# Patient Record
Sex: FEMALE | Race: WHITE | Employment: OTHER | URBAN - METROPOLITAN AREA
[De-identification: names, ages, dates, MRNs, and addresses within clinical notes are randomized per-mention and may not be internally consistent; named-entity substitution may affect disease eponyms.]

---

## 2020-01-07 ENCOUNTER — HOSPITAL ENCOUNTER (EMERGENCY)
Age: 40
Discharge: HOME | End: 2020-01-07
Attending: EMERGENCY MEDICINE
Payer: MEDICARE

## 2020-01-07 ENCOUNTER — APPOINTMENT (OUTPATIENT)
Dept: CT IMAGING | Age: 40
End: 2020-01-07
Payer: MEDICARE

## 2020-01-07 VITALS
TEMPERATURE: 98.5 F | BODY MASS INDEX: 29.83 KG/M2 | DIASTOLIC BLOOD PRESSURE: 79 MMHG | WEIGHT: 179.01 LBS | RESPIRATION RATE: 22 BRPM | HEIGHT: 65 IN | SYSTOLIC BLOOD PRESSURE: 144 MMHG | OXYGEN SATURATION: 99 % | HEART RATE: 99 BPM

## 2020-01-07 LAB
A/G RATIO: 1.4 (ref 1.1–2.2)
ALBUMIN SERPL-MCNC: 4.5 G/DL (ref 3.4–5)
ALP BLD-CCNC: 107 U/L (ref 40–129)
ALT SERPL-CCNC: 19 U/L (ref 10–40)
ANION GAP SERPL CALCULATED.3IONS-SCNC: 19 MMOL/L (ref 3–16)
AST SERPL-CCNC: 25 U/L (ref 15–37)
BACTERIA: ABNORMAL /HPF
BASOPHILS ABSOLUTE: 0.1 K/UL (ref 0–0.2)
BASOPHILS RELATIVE PERCENT: 0.5 %
BILIRUB SERPL-MCNC: 0.5 MG/DL (ref 0–1)
BILIRUBIN URINE: NEGATIVE
BLOOD, URINE: ABNORMAL
BUN BLDV-MCNC: 12 MG/DL (ref 7–20)
CALCIUM SERPL-MCNC: 9.2 MG/DL (ref 8.3–10.6)
CHLORIDE BLD-SCNC: 96 MMOL/L (ref 99–110)
CLARITY: CLEAR
CO2: 18 MMOL/L (ref 21–32)
COLOR: YELLOW
COMMENT UA: ABNORMAL
CREAT SERPL-MCNC: <0.5 MG/DL (ref 0.6–1.1)
EKG ATRIAL RATE: 107 BPM
EKG DIAGNOSIS: NORMAL
EKG P AXIS: 69 DEGREES
EKG P-R INTERVAL: 170 MS
EKG Q-T INTERVAL: 346 MS
EKG QRS DURATION: 102 MS
EKG QTC CALCULATION (BAZETT): 461 MS
EKG R AXIS: 83 DEGREES
EKG T AXIS: 23 DEGREES
EKG VENTRICULAR RATE: 107 BPM
EOSINOPHILS ABSOLUTE: 0 K/UL (ref 0–0.6)
EOSINOPHILS RELATIVE PERCENT: 0.1 %
EPITHELIAL CELLS, UA: 5 /HPF (ref 0–5)
GFR AFRICAN AMERICAN: >60
GFR NON-AFRICAN AMERICAN: >60
GLOBULIN: 3.2 G/DL
GLUCOSE BLD-MCNC: 102 MG/DL (ref 70–99)
GLUCOSE BLD-MCNC: 116 MG/DL (ref 70–99)
GLUCOSE URINE: NEGATIVE MG/DL
HCG QUALITATIVE: NEGATIVE
HCT VFR BLD CALC: 41.7 % (ref 36–48)
HEMOGLOBIN: 14.3 G/DL (ref 12–16)
HYALINE CASTS: 11 /LPF (ref 0–8)
KETONES, URINE: >=80 MG/DL
LEUKOCYTE ESTERASE, URINE: NEGATIVE
LIPASE: 8 U/L (ref 13–60)
LYMPHOCYTES ABSOLUTE: 1 K/UL (ref 1–5.1)
LYMPHOCYTES RELATIVE PERCENT: 8.2 %
MCH RBC QN AUTO: 29.9 PG (ref 26–34)
MCHC RBC AUTO-ENTMCNC: 34.2 G/DL (ref 31–36)
MCV RBC AUTO: 87.3 FL (ref 80–100)
MICROSCOPIC EXAMINATION: YES
MONOCYTES ABSOLUTE: 0.7 K/UL (ref 0–1.3)
MONOCYTES RELATIVE PERCENT: 5.6 %
NEUTROPHILS ABSOLUTE: 10.4 K/UL (ref 1.7–7.7)
NEUTROPHILS RELATIVE PERCENT: 85.6 %
NITRITE, URINE: NEGATIVE
PDW BLD-RTO: 12.5 % (ref 12.4–15.4)
PERFORMED ON: ABNORMAL
PH UA: 6 (ref 5–8)
PLATELET # BLD: 300 K/UL (ref 135–450)
PMV BLD AUTO: 10.3 FL (ref 5–10.5)
POTASSIUM REFLEX MAGNESIUM: 4.3 MMOL/L (ref 3.5–5.1)
PROTEIN UA: NEGATIVE MG/DL
RBC # BLD: 4.77 M/UL (ref 4–5.2)
RBC UA: ABNORMAL /HPF (ref 0–2)
SODIUM BLD-SCNC: 133 MMOL/L (ref 136–145)
SPECIFIC GRAVITY UA: 1.02 (ref 1–1.03)
TOTAL PROTEIN: 7.7 G/DL (ref 6.4–8.2)
URINE REFLEX TO CULTURE: YES
URINE TYPE: ABNORMAL
UROBILINOGEN, URINE: 0.2 E.U./DL
WBC # BLD: 12.1 K/UL (ref 4–11)
WBC UA: 9 /HPF (ref 0–5)

## 2020-01-07 PROCEDURE — 6360000002 HC RX W HCPCS: Performed by: EMERGENCY MEDICINE

## 2020-01-07 PROCEDURE — 87086 URINE CULTURE/COLONY COUNT: CPT

## 2020-01-07 PROCEDURE — 81001 URINALYSIS AUTO W/SCOPE: CPT

## 2020-01-07 PROCEDURE — 93005 ELECTROCARDIOGRAM TRACING: CPT | Performed by: EMERGENCY MEDICINE

## 2020-01-07 PROCEDURE — 80053 COMPREHEN METABOLIC PANEL: CPT

## 2020-01-07 PROCEDURE — 70450 CT HEAD/BRAIN W/O DYE: CPT

## 2020-01-07 PROCEDURE — 6370000000 HC RX 637 (ALT 250 FOR IP): Performed by: EMERGENCY MEDICINE

## 2020-01-07 PROCEDURE — 93010 ELECTROCARDIOGRAM REPORT: CPT | Performed by: INTERNAL MEDICINE

## 2020-01-07 PROCEDURE — 99285 EMERGENCY DEPT VISIT HI MDM: CPT

## 2020-01-07 PROCEDURE — 84703 CHORIONIC GONADOTROPIN ASSAY: CPT

## 2020-01-07 PROCEDURE — 83690 ASSAY OF LIPASE: CPT

## 2020-01-07 PROCEDURE — 2580000003 HC RX 258: Performed by: EMERGENCY MEDICINE

## 2020-01-07 PROCEDURE — 85025 COMPLETE CBC W/AUTO DIFF WBC: CPT

## 2020-01-07 PROCEDURE — 96374 THER/PROPH/DIAG INJ IV PUSH: CPT

## 2020-01-07 RX ORDER — PREGABALIN 300 MG/1
300 CAPSULE ORAL DAILY
COMMUNITY
End: 2020-01-07 | Stop reason: SDUPTHER

## 2020-01-07 RX ORDER — TIZANIDINE 4 MG/1
4-8 TABLET ORAL EVERY 6 HOURS PRN
COMMUNITY

## 2020-01-07 RX ORDER — METHIMAZOLE 10 MG/1
5 TABLET ORAL EVERY MORNING
COMMUNITY

## 2020-01-07 RX ORDER — ONDANSETRON 2 MG/ML
4 INJECTION INTRAMUSCULAR; INTRAVENOUS ONCE
Status: COMPLETED | OUTPATIENT
Start: 2020-01-07 | End: 2020-01-07

## 2020-01-07 RX ORDER — NORTRIPTYLINE HYDROCHLORIDE 75 MG/1
75 CAPSULE ORAL NIGHTLY
COMMUNITY

## 2020-01-07 RX ORDER — PREGABALIN 300 MG/1
300 CAPSULE ORAL DAILY
Qty: 30 CAPSULE | Refills: 0 | Status: SHIPPED | OUTPATIENT
Start: 2020-01-07 | End: 2020-02-06

## 2020-01-07 RX ORDER — ACETAMINOPHEN 500 MG
1000 TABLET ORAL ONCE
Status: COMPLETED | OUTPATIENT
Start: 2020-01-07 | End: 2020-01-07

## 2020-01-07 RX ORDER — 0.9 % SODIUM CHLORIDE 0.9 %
1000 INTRAVENOUS SOLUTION INTRAVENOUS ONCE
Status: COMPLETED | OUTPATIENT
Start: 2020-01-07 | End: 2020-01-07

## 2020-01-07 RX ORDER — KETOROLAC TROMETHAMINE 30 MG/ML
30 INJECTION, SOLUTION INTRAMUSCULAR; INTRAVENOUS ONCE
Status: DISCONTINUED | OUTPATIENT
Start: 2020-01-07 | End: 2020-01-07

## 2020-01-07 RX ORDER — PREGABALIN 100 MG/1
200 CAPSULE ORAL ONCE
Status: COMPLETED | OUTPATIENT
Start: 2020-01-07 | End: 2020-01-07

## 2020-01-07 RX ORDER — OXYCODONE HYDROCHLORIDE 5 MG/1
5 TABLET ORAL ONCE
Status: COMPLETED | OUTPATIENT
Start: 2020-01-07 | End: 2020-01-07

## 2020-01-07 RX ADMIN — ONDANSETRON 4 MG: 2 INJECTION INTRAMUSCULAR; INTRAVENOUS at 12:07

## 2020-01-07 RX ADMIN — PREGABALIN 200 MG: 100 CAPSULE ORAL at 15:48

## 2020-01-07 RX ADMIN — ACETAMINOPHEN 1000 MG: 500 TABLET ORAL at 12:40

## 2020-01-07 RX ADMIN — SODIUM CHLORIDE 1000 ML: 9 INJECTION, SOLUTION INTRAVENOUS at 12:06

## 2020-01-07 RX ADMIN — OXYCODONE 5 MG: 5 TABLET ORAL at 14:53

## 2020-01-07 ASSESSMENT — PAIN DESCRIPTION - ONSET: ONSET: ON-GOING

## 2020-01-07 ASSESSMENT — PAIN - FUNCTIONAL ASSESSMENT: PAIN_FUNCTIONAL_ASSESSMENT: 0-10

## 2020-01-07 ASSESSMENT — PAIN SCALES - GENERAL
PAINLEVEL_OUTOF10: 10

## 2020-01-07 ASSESSMENT — PAIN DESCRIPTION - FREQUENCY: FREQUENCY: CONTINUOUS

## 2020-01-07 ASSESSMENT — PAIN DESCRIPTION - PAIN TYPE
TYPE: ACUTE PAIN
TYPE: ACUTE PAIN

## 2020-01-07 ASSESSMENT — PAIN SCALES - WONG BAKER: WONGBAKER_NUMERICALRESPONSE: 6

## 2020-01-07 ASSESSMENT — PAIN DESCRIPTION - DESCRIPTORS: DESCRIPTORS: OTHER (COMMENT)

## 2020-01-07 ASSESSMENT — PAIN DESCRIPTION - LOCATION
LOCATION: GENERALIZED
LOCATION: ABDOMEN

## 2020-01-07 NOTE — ED PROVIDER NOTES
CHIEF COMPLAINT  Altered Mental Status (started yesterday) and Abdominal Pain (3-4days)      HISTORY OF PRESENT ILLNESS  Jaclyn Hawthorne is a 37 y.o. female with history of fibromyalgia, asthma and depression who presents to the ED complaining of nausea and vomiting over the past 3 days. Patient states she has generalized abdominal pain associated with vomiting. Denies any hematemesis. States she has noticed some increase in frequency of urination. Denies any dysuria. No diarrhea. Patient in the emergency room with her father and her father states that she was confused today and did not know what month she was then. Denies her being any confusion yesterday. Patient denies any headache or vision changes. Denies any numbness or weakness. No neck pain or back pain. Denies any cough or sputum production. No shortness of breath. No documented fevers. No other complaints, modifying factors or associated symptoms. Nursing notes reviewed. Past Medical History:   Diagnosis Date    Asthma     Depression     Fibromyalgia      Past Surgical History:   Procedure Laterality Date    ADENOIDECTOMY       History reviewed. No pertinent family history.   Social History     Socioeconomic History    Marital status: Single     Spouse name: Not on file    Number of children: Not on file    Years of education: Not on file    Highest education level: Not on file   Occupational History    Not on file   Social Needs    Financial resource strain: Not on file    Food insecurity:     Worry: Not on file     Inability: Not on file    Transportation needs:     Medical: Not on file     Non-medical: Not on file   Tobacco Use    Smoking status: Current Every Day Smoker     Packs/day: 0.25     Years: 10.00     Pack years: 2.50    Smokeless tobacco: Never Used   Substance and Sexual Activity    Alcohol use: Yes     Comment: VERY RARE    Drug use: Yes     Types: Marijuana     Comment: once a week medical    Sexual activity: Yes     Partners: Male   Lifestyle    Physical activity:     Days per week: Not on file     Minutes per session: Not on file    Stress: Not on file   Relationships    Social connections:     Talks on phone: Not on file     Gets together: Not on file     Attends Worship service: Not on file     Active member of club or organization: Not on file     Attends meetings of clubs or organizations: Not on file     Relationship status: Not on file    Intimate partner violence:     Fear of current or ex partner: Not on file     Emotionally abused: Not on file     Physically abused: Not on file     Forced sexual activity: Not on file   Other Topics Concern    Not on file   Social History Narrative    Not on file     Current Facility-Administered Medications   Medication Dose Route Frequency Provider Last Rate Last Dose    oxyCODONE (ROXICODONE) immediate release tablet 5 mg  5 mg Oral Once Eli Fitting, MD        pregabalin (LYRICA) capsule 200 mg  200 mg Oral Once Eli Fitting, MD         Current Outpatient Medications   Medication Sig Dispense Refill    methIMAzole (TAPAZOLE) 10 MG tablet Take 5 mg by mouth every morning      nortriptyline (PAMELOR) 75 MG capsule Take 75 mg by mouth nightly      tiZANidine (ZANAFLEX) 4 MG tablet Take 4-8 mg by mouth every 6 hours as needed      pregabalin (LYRICA) 300 MG capsule Take 1 capsule by mouth daily for 30 days.  30 capsule 0    albuterol (PROAIR HFA) 108 (90 BASE) MCG/ACT inhaler Inhale 2 puffs into the lungs every 6 hours as needed for Wheezing 1 Inhaler 3     Allergies   Allergen Reactions    Pcn [Penicillins] Anaphylaxis    Amoxapine And Related [Amoxapine And Related] Nausea And Vomiting         REVIEW OF SYSTEMS  10 systems reviewed, pertinent positives per HPI otherwise noted to be negative    PHYSICAL EXAM  BP (!) 151/89   Pulse 112   Temp 98.5 °F (36.9 °C) (Oral)   Resp 16   Ht 5' 5\" (1.651 m)   Wt 179 lb 0.2 oz (81.2 kg)   LMP 06/03/2016 Patient's lab work shows mildly elevated WBC at 12.1 which is likely reactive from emesis. Urinalysis negative for infection but does show greater than 80 ketones. Patient was given IV fluids lipase normal, urinalysis negative for infection, pregnancy negative. Normal creatinine. Patient updated on results. Patient states she still feels \"terrible\". When asked if patient takes anything extra on top of her Lyrica for pain control, patient unable to answer the question secondary to confusion. Patient asked if she has to go back to work. Discussed that we should obtain a CT head. Patient agreeable. Will provide Toradol for pain control.    [DS]   1423 Patient CT head unremarkable. Reassessed patient and discussed results with patient and father. Father states that patient forgot to tell us that she has been off her Lyrica for the past week and a half as she ran out of her prescription. Patient has not followed up with her primary care physician for refill. Discussed the importance of not stopping this medication abruptly as it can cause symptoms that she is having now including pain confusion and GI upset. Will provide a dose of Lyrica here in the ED, refill patient's Lyrica prescription and discharge for outpatient follow-up. Patient and father agreeable. [DS]      ED Course User Index  [DS] Ramiro Tee MD         Patient was given scripts for the following medications. I counseled patient how to take these medications. Current Discharge Medication List              CLINICAL IMPRESSION  1. Fibromyalgia    2. Encounter for medication refill    3. Confusion        Blood pressure (!) 151/89, pulse 112, temperature 98.5 °F (36.9 °C), temperature source Oral, resp. rate 16, height 5' 5\" (1.651 m), weight 179 lb 0.2 oz (81.2 kg), last menstrual period 06/03/2016, SpO2 98 %. DISPOSITION  Patient was discharged to home in good condition.     Gege Neville MD  [address] [facility] [address]  [phone]    Call today  For a follow up appointment. Disclaimer: All medical record entries made by [address] [place] [address] [address] [facility].       (Please note that this note was completed with a voice recognition program. Every attempt was made to edit the dictations, but inevitably there remain words that are mis-transcribed.)            Lenore Rodriguez MD  01/07/20 [id number]

## 2020-01-07 NOTE — ED NOTES
Pt to ED from home with father at bedside with c/o AMS. Pt father states that she has been altered since yesterday. Pt also has report of nausea and abd pain but when asked about the abd pain pt states that her pain is \"everywhere\" and will not describe what the pain feels like. Upon further assessment she states that the pain is from her fibromyalgia and is a 10/10. When asked when her symptoms stated the pt states that \"when she was working at [employer]. \" Her father reports that she has not worked at [employer] for 4 years. She also has c/o not being able to remember her \"passwords. \" Pt able to states name and birthday but does not know the year or situation.       Anh Hendricks RN  01/07/20 1052

## 2020-01-08 LAB — URINE CULTURE, ROUTINE: NORMAL
